# Patient Record
Sex: MALE | NOT HISPANIC OR LATINO
[De-identification: names, ages, dates, MRNs, and addresses within clinical notes are randomized per-mention and may not be internally consistent; named-entity substitution may affect disease eponyms.]

---

## 2017-11-24 PROBLEM — Z00.00 ENCOUNTER FOR PREVENTIVE HEALTH EXAMINATION: Status: ACTIVE | Noted: 2017-11-24

## 2018-03-23 ENCOUNTER — APPOINTMENT (OUTPATIENT)
Dept: CARDIOLOGY | Facility: CLINIC | Age: 48
End: 2018-03-23
Payer: COMMERCIAL

## 2018-03-23 VITALS
HEIGHT: 70 IN | BODY MASS INDEX: 28.63 KG/M2 | WEIGHT: 200 LBS | DIASTOLIC BLOOD PRESSURE: 74 MMHG | RESPIRATION RATE: 15 BRPM | HEART RATE: 76 BPM | SYSTOLIC BLOOD PRESSURE: 120 MMHG

## 2018-03-23 DIAGNOSIS — Z82.49 FAMILY HISTORY OF ISCHEMIC HEART DISEASE AND OTHER DISEASES OF THE CIRCULATORY SYSTEM: ICD-10-CM

## 2018-03-23 PROCEDURE — 93306 TTE W/DOPPLER COMPLETE: CPT

## 2018-03-23 PROCEDURE — 93015 CV STRESS TEST SUPVJ I&R: CPT

## 2018-03-23 PROCEDURE — 93000 ELECTROCARDIOGRAM COMPLETE: CPT | Mod: 59

## 2018-03-23 PROCEDURE — 99213 OFFICE O/P EST LOW 20 MIN: CPT | Mod: 25

## 2018-08-03 ENCOUNTER — APPOINTMENT (OUTPATIENT)
Dept: CARDIOLOGY | Facility: CLINIC | Age: 48
End: 2018-08-03
Payer: COMMERCIAL

## 2018-08-03 VITALS
HEIGHT: 70 IN | HEART RATE: 74 BPM | SYSTOLIC BLOOD PRESSURE: 127 MMHG | WEIGHT: 203 LBS | RESPIRATION RATE: 15 BRPM | DIASTOLIC BLOOD PRESSURE: 80 MMHG | BODY MASS INDEX: 29.06 KG/M2

## 2018-08-03 PROCEDURE — 99213 OFFICE O/P EST LOW 20 MIN: CPT

## 2018-08-03 RX ORDER — RISPERIDONE 2 MG/1
2 TABLET, FILM COATED ORAL
Refills: 0 | Status: ACTIVE | COMMUNITY

## 2018-10-05 ENCOUNTER — APPOINTMENT (OUTPATIENT)
Dept: CARDIOLOGY | Facility: CLINIC | Age: 48
End: 2018-10-05

## 2019-01-04 ENCOUNTER — NON-APPOINTMENT (OUTPATIENT)
Age: 49
End: 2019-01-04

## 2019-01-04 ENCOUNTER — APPOINTMENT (OUTPATIENT)
Dept: CARDIOLOGY | Facility: CLINIC | Age: 49
End: 2019-01-04
Payer: COMMERCIAL

## 2019-01-04 VITALS
SYSTOLIC BLOOD PRESSURE: 116 MMHG | HEIGHT: 70 IN | BODY MASS INDEX: 29.63 KG/M2 | HEART RATE: 68 BPM | DIASTOLIC BLOOD PRESSURE: 78 MMHG | WEIGHT: 207 LBS | RESPIRATION RATE: 15 BRPM

## 2019-01-04 DIAGNOSIS — Z87.898 PERSONAL HISTORY OF OTHER SPECIFIED CONDITIONS: ICD-10-CM

## 2019-01-04 PROCEDURE — 99213 OFFICE O/P EST LOW 20 MIN: CPT

## 2019-01-04 PROCEDURE — 93000 ELECTROCARDIOGRAM COMPLETE: CPT

## 2019-06-28 ENCOUNTER — APPOINTMENT (OUTPATIENT)
Dept: CARDIOLOGY | Facility: CLINIC | Age: 49
End: 2019-06-28
Payer: COMMERCIAL

## 2019-06-28 PROCEDURE — 93306 TTE W/DOPPLER COMPLETE: CPT

## 2019-07-24 ENCOUNTER — APPOINTMENT (OUTPATIENT)
Dept: CARDIOLOGY | Facility: CLINIC | Age: 49
End: 2019-07-24
Payer: COMMERCIAL

## 2019-07-24 ENCOUNTER — NON-APPOINTMENT (OUTPATIENT)
Age: 49
End: 2019-07-24

## 2019-07-24 VITALS
DIASTOLIC BLOOD PRESSURE: 79 MMHG | RESPIRATION RATE: 15 BRPM | BODY MASS INDEX: 29.63 KG/M2 | HEIGHT: 70 IN | WEIGHT: 207 LBS | HEART RATE: 64 BPM | SYSTOLIC BLOOD PRESSURE: 125 MMHG

## 2019-07-24 PROCEDURE — 99213 OFFICE O/P EST LOW 20 MIN: CPT

## 2019-07-24 PROCEDURE — 93000 ELECTROCARDIOGRAM COMPLETE: CPT

## 2020-01-22 ENCOUNTER — APPOINTMENT (OUTPATIENT)
Dept: CARDIOLOGY | Facility: CLINIC | Age: 50
End: 2020-01-22
Payer: COMMERCIAL

## 2020-01-22 VITALS
HEART RATE: 64 BPM | RESPIRATION RATE: 15 BRPM | WEIGHT: 209 LBS | SYSTOLIC BLOOD PRESSURE: 125 MMHG | DIASTOLIC BLOOD PRESSURE: 79 MMHG | HEIGHT: 70 IN | BODY MASS INDEX: 29.92 KG/M2

## 2020-01-22 PROCEDURE — 99214 OFFICE O/P EST MOD 30 MIN: CPT

## 2020-07-01 ENCOUNTER — APPOINTMENT (OUTPATIENT)
Dept: CARDIOLOGY | Facility: CLINIC | Age: 50
End: 2020-07-01
Payer: COMMERCIAL

## 2020-07-01 ENCOUNTER — TRANSCRIPTION ENCOUNTER (OUTPATIENT)
Age: 50
End: 2020-07-01

## 2020-07-01 VITALS
SYSTOLIC BLOOD PRESSURE: 129 MMHG | HEIGHT: 70 IN | DIASTOLIC BLOOD PRESSURE: 80 MMHG | HEART RATE: 62 BPM | WEIGHT: 208 LBS | RESPIRATION RATE: 15 BRPM | BODY MASS INDEX: 29.78 KG/M2

## 2020-07-01 PROCEDURE — 93015 CV STRESS TEST SUPVJ I&R: CPT

## 2020-07-01 PROCEDURE — 99213 OFFICE O/P EST LOW 20 MIN: CPT | Mod: 25

## 2020-07-01 PROCEDURE — 93306 TTE W/DOPPLER COMPLETE: CPT

## 2020-07-01 PROCEDURE — ZZZZZ: CPT

## 2020-07-01 NOTE — PHYSICAL EXAM
[General Appearance - Well Developed] : well developed [Well Groomed] : well groomed [Normal Appearance] : normal appearance [General Appearance - Well Nourished] : well nourished [General Appearance - In No Acute Distress] : no acute distress [Normal Conjunctiva] : the conjunctiva exhibited no abnormalities [No Deformities] : no deformities [Normal Oral Mucosa] : normal oral mucosa [No Oral Pallor] : no oral pallor [Normal Jugular Venous A Waves Present] : normal jugular venous A waves present [Normal Oropharynx] : normal oropharynx [Normal Jugular Venous V Waves Present] : normal jugular venous V waves present [No Jugular Venous Yip A Waves] : no jugular venous yip A waves [Exaggerated Use Of Accessory Muscles For Inspiration] : no accessory muscle use [Respiration, Rhythm And Depth] : normal respiratory rhythm and effort [Auscultation Breath Sounds / Voice Sounds] : lungs were clear to auscultation bilaterally [Bowel Sounds] : normal bowel sounds [Abdomen Soft] : soft [Abdomen Tenderness] : non-tender [Abnormal Walk] : normal gait [Nail Clubbing] : no clubbing of the fingernails [Petechial Hemorrhages (___cm)] : no petechial hemorrhages [Gait - Sufficient For Exercise Testing] : the gait was sufficient for exercise testing [Cyanosis, Localized] : no localized cyanosis [Skin Turgor] : normal skin turgor [] : no rash [Skin Color & Pigmentation] : normal skin color and pigmentation [Oriented To Time, Place, And Person] : oriented to person, place, and time [No Venous Stasis] : no venous stasis [Mood] : the mood was normal [No Anxiety] : not feeling anxious [Impaired Insight] : insight and judgment were intact [Affect] : the affect was normal [5th Left ICS - MCL] : palpated at the 5th LICS in the midclavicular line [Normal] : normal [Normal Rate] : normal [No Precordial Heave] : no precordial heave was noted [Rhythm Regular] : regular [Normal S1] : normal S1 [Normal S2] : normal S2 [No Gallop] : no gallop heard [S3] : no S3 [S4] : no S4 [No Murmur] : no murmurs heard [Right Carotid Bruit] : no bruit heard over the right carotid [Left Carotid Bruit] : no bruit heard over the left carotid [Right Femoral Bruit] : no bruit heard over the right femoral artery [Left Femoral Bruit] : no bruit heard over the left femoral artery [2+] : left 2+ [No Abnormalities] : the abdominal aorta was not enlarged and no bruit was heard [No Pitting Edema] : no pitting edema present

## 2020-07-01 NOTE — REASON FOR VISIT
[Mitral Regurgitation] : mitral regurgitation [Follow-Up - Clinic] : a clinic follow-up of [Palpitations] : palpitations [FreeTextEntry1] : 49 y/o male with PMHx of MVP, GERD and anxiety presents for follow up.\par

## 2020-07-01 NOTE — DISCUSSION/SUMMARY
[FreeTextEntry1] : This is a 50-year-old male with a history significant for occasional palpitations,dyspepsia,  MVP/MR, borderline elevated cholesterol, who comes in for followup cardiac evaluation.\par The patient had a normal exercise stress test July 1, 2020.\par He will have an echo Doppler examination later today to evaluate his murmur, left ventricular function, chamber size, and rule out hypertrophy.\par The patient is encouraged to increase his hydration.\par Please send me a copy of his most recent blood work for my review.\par \par An Echo Doppler examination June 28, 2019 demonstrated bileaflet mitral valve prolapse with mitral valve regurgitation, tricuspid valve regurgitation, pulmonic valve regurgitation.\par The patient understands that aerobic exercises must be increased to 40 minutes 4 times per week. A detailed discussion of lifestyle modification was done today. The patient has a good understanding of the diagnosis, and treatment plan. Lifestyle modification was also outlined.\par \par \par \par The patient had an echo Doppler examination done June 2019 which demonstrated bileaflet mitral valve prolapse with mild mitral valve regurgitation, tricuspid valve regurgitation satisfactory left ventricular function.  This is essentially unchanged from his echocardiogram done March 23, 2018.\par The patient denies chest pain, palpitations, or dizziness.\par The patient will followup with you regarding his overall medical care. He is aware of his mitral valve prolapse with mild mitral valve regurgitation.\par Electrocardiogram done January 4, 2019 demonstrates sinus bradycardia at 50 beats per min is otherwise unremarkable.\par \par The patient understands that aerobic exercises must be increased to 40 minutes 4 times per week. A detailed discussion of lifestyle modification was done today. The patient has a good understanding of the diagnosis, and treatment plan. Lifestyle modification was also outlined.

## 2020-07-01 NOTE — HISTORY OF PRESENT ILLNESS
[FreeTextEntry1] : 50 y/o male with PMHx of MVP, GERD and anxiety presents for follow up.\par \par He does not exercise at the gym per se but endorses being very active and denies any CP, SOB, palpitations, dizziness or claudication.

## 2020-11-17 ENCOUNTER — APPOINTMENT (OUTPATIENT)
Dept: CARDIOLOGY | Facility: CLINIC | Age: 50
End: 2020-11-17
Payer: COMMERCIAL

## 2020-11-17 VITALS
HEIGHT: 70 IN | BODY MASS INDEX: 30.64 KG/M2 | HEART RATE: 75 BPM | SYSTOLIC BLOOD PRESSURE: 120 MMHG | DIASTOLIC BLOOD PRESSURE: 80 MMHG | WEIGHT: 214 LBS | RESPIRATION RATE: 16 BRPM

## 2020-11-17 PROCEDURE — 99072 ADDL SUPL MATRL&STAF TM PHE: CPT

## 2020-11-17 PROCEDURE — 99213 OFFICE O/P EST LOW 20 MIN: CPT

## 2021-10-06 ENCOUNTER — APPOINTMENT (OUTPATIENT)
Dept: CARDIOLOGY | Facility: CLINIC | Age: 51
End: 2021-10-06
Payer: COMMERCIAL

## 2021-10-06 ENCOUNTER — NON-APPOINTMENT (OUTPATIENT)
Age: 51
End: 2021-10-06

## 2021-10-06 ENCOUNTER — LABORATORY RESULT (OUTPATIENT)
Age: 51
End: 2021-10-06

## 2021-10-06 VITALS
OXYGEN SATURATION: 99 % | BODY MASS INDEX: 29.92 KG/M2 | HEART RATE: 60 BPM | TEMPERATURE: 97.1 F | WEIGHT: 209 LBS | DIASTOLIC BLOOD PRESSURE: 84 MMHG | SYSTOLIC BLOOD PRESSURE: 120 MMHG | HEIGHT: 70 IN | RESPIRATION RATE: 16 BRPM

## 2021-10-06 PROCEDURE — 93000 ELECTROCARDIOGRAM COMPLETE: CPT

## 2021-10-06 PROCEDURE — 93306 TTE W/DOPPLER COMPLETE: CPT

## 2021-10-06 PROCEDURE — 99213 OFFICE O/P EST LOW 20 MIN: CPT

## 2021-10-06 RX ORDER — OMEPRAZOLE 40 MG/1
40 CAPSULE, DELAYED RELEASE ORAL
Refills: 0 | Status: COMPLETED | COMMUNITY
End: 2021-10-06

## 2021-10-06 RX ORDER — OMEPRAZOLE 20 MG/1
20 CAPSULE, DELAYED RELEASE ORAL
Qty: 30 | Refills: 3 | Status: ACTIVE | COMMUNITY

## 2021-10-06 NOTE — REASON FOR VISIT
[Follow-Up - Clinic] : a clinic follow-up of [Mitral Regurgitation] : mitral regurgitation [Palpitations] : palpitations [CV Risk Factors and Non-Cardiac Disease] : CV risk factors and non-cardiac disease [Other: ____] : [unfilled] [FreeTextEntry1] : MVP, murmur

## 2021-10-06 NOTE — PHYSICAL EXAM
[General Appearance - Well Developed] : well developed [Normal Appearance] : normal appearance [Well Groomed] : well groomed [General Appearance - Well Nourished] : well nourished [No Deformities] : no deformities [General Appearance - In No Acute Distress] : no acute distress [Normal Conjunctiva] : the conjunctiva exhibited no abnormalities [Normal Oral Mucosa] : normal oral mucosa [No Oral Pallor] : no oral pallor [Normal Oropharynx] : normal oropharynx [Normal Jugular Venous A Waves Present] : normal jugular venous A waves present [Normal Jugular Venous V Waves Present] : normal jugular venous V waves present [No Jugular Venous Yip A Waves] : no jugular venous yip A waves [Respiration, Rhythm And Depth] : normal respiratory rhythm and effort [Exaggerated Use Of Accessory Muscles For Inspiration] : no accessory muscle use [Auscultation Breath Sounds / Voice Sounds] : lungs were clear to auscultation bilaterally [Bowel Sounds] : normal bowel sounds [Abdomen Soft] : soft [Abdomen Tenderness] : non-tender [Abnormal Walk] : normal gait [Gait - Sufficient For Exercise Testing] : the gait was sufficient for exercise testing [Nail Clubbing] : no clubbing of the fingernails [Cyanosis, Localized] : no localized cyanosis [Petechial Hemorrhages (___cm)] : no petechial hemorrhages [Skin Color & Pigmentation] : normal skin color and pigmentation [Skin Turgor] : normal skin turgor [] : no rash [No Venous Stasis] : no venous stasis [Impaired Insight] : insight and judgment were intact [Oriented To Time, Place, And Person] : oriented to person, place, and time [Affect] : the affect was normal [Mood] : the mood was normal [No Anxiety] : not feeling anxious [5th Left ICS - MCL] : palpated at the 5th LICS in the midclavicular line [Normal] : normal [No Precordial Heave] : no precordial heave was noted [Rhythm Regular] : regular [Normal Rate] : normal [Normal S1] : normal S1 [Normal S2] : normal S2 [No Gallop] : no gallop heard [No Murmur] : no murmurs heard [2+] : left 2+ [No Abnormalities] : the abdominal aorta was not enlarged and no bruit was heard [No Pitting Edema] : no pitting edema present [S3] : no S3 [S4] : no S4 [Right Carotid Bruit] : no bruit heard over the right carotid [Left Carotid Bruit] : no bruit heard over the left carotid [Left Femoral Bruit] : no bruit heard over the left femoral artery [Right Femoral Bruit] : no bruit heard over the right femoral artery

## 2021-10-06 NOTE — DISCUSSION/SUMMARY
[FreeTextEntry1] : This is a 51-year-old male with a history significant for occasional palpitations,dyspepsia, bileaflet mitral valve prolapse with minimal to mild mitral valve regurgitation, borderline elevated cholesterol, who comes in for followup cardiac evaluation. \par He denies chest pain, shortness of breath, dizziness or syncope.\par The patient will have new blood work done today for lipid panel, and electrolytes.\par Electrocardiogram done October 6, 2021 demonstrate normal sinus rhythm rate 60 bpm and is otherwise unremarkable.\par Patient understands he must maintain good hydration.\par Echo Doppler examination done July 1, 2020 which demonstrated bileaflet mitral valve prolapse and minimal to mild mitral valve regurgitation, thickened aortic valve leaflets, minimal tricuspid and pulmonic valve regurgitation with normal left ventricular function and estimated ejection fraction 64%.\par The patient had a normal exercise stress test July 1, 2020.\par \par Echo Doppler examination June 28, 2019 demonstrated bileaflet mitral valve prolapse with mitral valve regurgitation, tricuspid valve regurgitation, pulmonic valve regurgitation.\par \par The patient understands that aerobic exercises must be increased to 40 minutes 4 times per week. A detailed discussion of lifestyle modification was done today. The patient has a good understanding of the diagnosis, and treatment plan. Lifestyle modification was also outlined.\par \par \par \par The patient had an echo Doppler examination done June 2019 which demonstrated bileaflet mitral valve prolapse with mild mitral valve regurgitation, tricuspid valve regurgitation satisfactory left ventricular function.  This is essentially unchanged from his echocardiogram done March 23, 2018.\par The patient denies chest pain, palpitations, or dizziness.\par The patient will followup with you regarding his overall medical care. He is aware of his mitral valve prolapse with mild mitral valve regurgitation.\par Electrocardiogram done January 4, 2019 demonstrates sinus bradycardia at 50 beats per min is otherwise unremarkable.\par \par The patient understands that aerobic exercises must be increased to 40 minutes 4 times per week. A detailed discussion of lifestyle modification was done today. The patient has a good understanding of the diagnosis, and treatment plan. Lifestyle modification was also outlined.

## 2021-10-06 NOTE — HISTORY OF PRESENT ILLNESS
[FreeTextEntry1] : 49 y/o male with PMHx of MVP, GERD and anxiety presents for follow up.\par \par Doing well overall. Denies cp, sob, presyncope or syncope. Walks his dog twice a day. goes up and down the stairs without issues.\par \par

## 2021-10-12 ENCOUNTER — NON-APPOINTMENT (OUTPATIENT)
Age: 51
End: 2021-10-12

## 2022-04-05 ENCOUNTER — LABORATORY RESULT (OUTPATIENT)
Age: 52
End: 2022-04-05

## 2022-04-05 ENCOUNTER — APPOINTMENT (OUTPATIENT)
Dept: CARDIOLOGY | Facility: CLINIC | Age: 52
End: 2022-04-05
Payer: COMMERCIAL

## 2022-04-05 ENCOUNTER — NON-APPOINTMENT (OUTPATIENT)
Age: 52
End: 2022-04-05

## 2022-04-05 VITALS
HEIGHT: 70 IN | HEART RATE: 75 BPM | TEMPERATURE: 97.5 F | BODY MASS INDEX: 30.06 KG/M2 | WEIGHT: 210 LBS | DIASTOLIC BLOOD PRESSURE: 79 MMHG | OXYGEN SATURATION: 98 % | SYSTOLIC BLOOD PRESSURE: 125 MMHG | RESPIRATION RATE: 16 BRPM

## 2022-04-05 DIAGNOSIS — E66.3 OVERWEIGHT: ICD-10-CM

## 2022-04-05 PROCEDURE — 99214 OFFICE O/P EST MOD 30 MIN: CPT

## 2022-04-05 PROCEDURE — 93000 ELECTROCARDIOGRAM COMPLETE: CPT

## 2022-04-05 NOTE — DISCUSSION/SUMMARY
[FreeTextEntry1] : This is a 51-year-old male with a past medical history significant for mitral valve prolapse with mitral valve regurgitation, occasional palpitations,dyspepsia, borderline elevated cholesterol, who comes in for followup cardiac evaluation.  He denies chest pain, shortness of breath, dizziness or syncope.\par Electrocardiogram done April 5, 2022 demonstrate normal sinus rhythm rate of 72 bpm is otherwise unremarkable.\par He will have new blood work done today for lipid panel, SMA-20.  He understands he must maintain good hydration.\par Electrocardiogram done October 6, 2021 demonstrate normal sinus rhythm rate 60 bpm and is otherwise unremarkable.\par Patient understands he must maintain good hydration.\par Echo Doppler examination done July 1, 2020 which demonstrated bileaflet mitral valve prolapse and minimal to mild mitral valve regurgitation, thickened aortic valve leaflets, minimal tricuspid and pulmonic valve regurgitation with normal left ventricular function and estimated ejection fraction 64%.\par The patient had a normal exercise stress test July 1, 2020.\par Echo Doppler examination done October 6, 2021 demonstrated ejection fraction of 64 to 65%, bileaflet mitral valve prolapse and mild mitral valve regurgitation, minimal to mild tricuspid valve regurgitation, minimal pulmonic valve regurgitation.\par Echo Doppler examination June 28, 2019 demonstrated bileaflet mitral valve prolapse with mitral valve regurgitation, tricuspid valve regurgitation, pulmonic valve regurgitation.\par \par The patient understands that aerobic exercises must be increased to 40 minutes 4 times per week. A detailed discussion of lifestyle modification was done today. The patient has a good understanding of the diagnosis, and treatment plan. Lifestyle modification was also outlined.\par

## 2022-04-05 NOTE — REASON FOR VISIT
[CV Risk Factors and Non-Cardiac Disease] : CV risk factors and non-cardiac disease [Other: ____] : [unfilled] [Follow-Up - Clinic] : a clinic follow-up of [Mitral Regurgitation] : mitral regurgitation [Palpitations] : palpitations [FreeTextEntry1] : MVP, murmur

## 2022-04-05 NOTE — HISTORY OF PRESENT ILLNESS
[FreeTextEntry1] : 51 y/o male with PMHx of MVP, GERD and anxiety presents for follow up.\par \par Doing well overall. Denies cp, sob, presyncope or syncope. Walks his dog twice a day. goes up and down the stairs without issues.\par \par

## 2022-04-05 NOTE — PHYSICAL EXAM
[General Appearance - Well Developed] : well developed [Normal Appearance] : normal appearance [Well Groomed] : well groomed [General Appearance - Well Nourished] : well nourished [No Deformities] : no deformities [General Appearance - In No Acute Distress] : no acute distress [Normal Conjunctiva] : the conjunctiva exhibited no abnormalities [Normal Oral Mucosa] : normal oral mucosa [No Oral Pallor] : no oral pallor [Normal Oropharynx] : normal oropharynx [Normal Jugular Venous A Waves Present] : normal jugular venous A waves present [Normal Jugular Venous V Waves Present] : normal jugular venous V waves present [No Jugular Venous Yip A Waves] : no jugular venous yip A waves [Respiration, Rhythm And Depth] : normal respiratory rhythm and effort [Exaggerated Use Of Accessory Muscles For Inspiration] : no accessory muscle use [Auscultation Breath Sounds / Voice Sounds] : lungs were clear to auscultation bilaterally [Bowel Sounds] : normal bowel sounds [Abdomen Soft] : soft [Abdomen Tenderness] : non-tender [Abnormal Walk] : normal gait [Gait - Sufficient For Exercise Testing] : the gait was sufficient for exercise testing [Nail Clubbing] : no clubbing of the fingernails [Cyanosis, Localized] : no localized cyanosis [Petechial Hemorrhages (___cm)] : no petechial hemorrhages [Skin Color & Pigmentation] : normal skin color and pigmentation [Skin Turgor] : normal skin turgor [] : no rash [No Venous Stasis] : no venous stasis [Oriented To Time, Place, And Person] : oriented to person, place, and time [Impaired Insight] : insight and judgment were intact [Affect] : the affect was normal [Mood] : the mood was normal [No Anxiety] : not feeling anxious [5th Left ICS - MCL] : palpated at the 5th LICS in the midclavicular line [Normal] : normal [No Precordial Heave] : no precordial heave was noted [Normal Rate] : normal [Rhythm Regular] : regular [Normal S1] : normal S1 [Normal S2] : normal S2 [No Gallop] : no gallop heard [No Murmur] : no murmurs heard [2+] : left 2+ [No Abnormalities] : the abdominal aorta was not enlarged and no bruit was heard [No Pitting Edema] : no pitting edema present [S3] : no S3 [S4] : no S4 [Right Carotid Bruit] : no bruit heard over the right carotid [Left Carotid Bruit] : no bruit heard over the left carotid [Right Femoral Bruit] : no bruit heard over the right femoral artery [Left Femoral Bruit] : no bruit heard over the left femoral artery

## 2022-10-25 ENCOUNTER — LABORATORY RESULT (OUTPATIENT)
Age: 52
End: 2022-10-25

## 2022-10-25 ENCOUNTER — APPOINTMENT (OUTPATIENT)
Dept: CARDIOLOGY | Facility: CLINIC | Age: 52
End: 2022-10-25

## 2022-10-25 VITALS
RESPIRATION RATE: 16 BRPM | DIASTOLIC BLOOD PRESSURE: 77 MMHG | HEART RATE: 79 BPM | OXYGEN SATURATION: 100 % | SYSTOLIC BLOOD PRESSURE: 124 MMHG | HEIGHT: 70 IN | BODY MASS INDEX: 30.06 KG/M2 | TEMPERATURE: 97.3 F | WEIGHT: 210 LBS

## 2022-10-25 PROCEDURE — 93015 CV STRESS TEST SUPVJ I&R: CPT

## 2022-10-25 PROCEDURE — 99213 OFFICE O/P EST LOW 20 MIN: CPT | Mod: 25

## 2022-10-25 NOTE — DISCUSSION/SUMMARY
[FreeTextEntry1] : This is a 52-year-old male with a past medical history significant for mitral valve prolapse with mitral valve regurgitation, occasional palpitations,dyspepsia, borderline elevated cholesterol, who comes in for followup cardiac evaluation.  He denies chest pain, shortness of breath, dizziness or syncope.  He may get occasional dyspnea on exertion with stairs.\par The patient had normal exercise stress test October 25, 2022.\par Electrocardiogram done April 5, 2022 demonstrate normal sinus rhythm rate of 72 bpm is otherwise unremarkable.\par The patient will have new blood work done today for lipid panel, SMA-20, hemoglobin A1c and TSH.\par Electrocardiogram done October 6, 2021 demonstrate normal sinus rhythm rate 60 bpm and is otherwise unremarkable.\par Patient understands he must maintain good hydration.\par Echo Doppler examination done July 1, 2020 which demonstrated bileaflet mitral valve prolapse and minimal to mild mitral valve regurgitation, thickened aortic valve leaflets, minimal tricuspid and pulmonic valve regurgitation with normal left ventricular function and estimated ejection fraction 64%.\par The patient had a normal exercise stress test July 1, 2020.\par Echo Doppler examination done October 6, 2021 demonstrated ejection fraction of 64 to 65%, bileaflet mitral valve prolapse and mild mitral valve regurgitation, minimal to mild tricuspid valve regurgitation, minimal pulmonic valve regurgitation.\par Echo Doppler examination June 28, 2019 demonstrated bileaflet mitral valve prolapse with mitral valve regurgitation, tricuspid valve regurgitation, pulmonic valve regurgitation.\par \par The patient understands that aerobic exercises must be increased to 40 minutes 4 times per week. A detailed discussion of lifestyle modification was done today. The patient has a good understanding of the diagnosis, and treatment plan. Lifestyle modification was also outlined.\par

## 2023-03-28 ENCOUNTER — LABORATORY RESULT (OUTPATIENT)
Age: 53
End: 2023-03-28

## 2023-03-28 ENCOUNTER — APPOINTMENT (OUTPATIENT)
Dept: CARDIOLOGY | Facility: CLINIC | Age: 53
End: 2023-03-28
Payer: COMMERCIAL

## 2023-03-28 ENCOUNTER — NON-APPOINTMENT (OUTPATIENT)
Age: 53
End: 2023-03-28

## 2023-03-28 VITALS
BODY MASS INDEX: 30.92 KG/M2 | HEART RATE: 55 BPM | WEIGHT: 216 LBS | RESPIRATION RATE: 16 BRPM | OXYGEN SATURATION: 99 % | TEMPERATURE: 97.5 F | HEIGHT: 70 IN | SYSTOLIC BLOOD PRESSURE: 125 MMHG | DIASTOLIC BLOOD PRESSURE: 79 MMHG

## 2023-03-28 PROCEDURE — 93000 ELECTROCARDIOGRAM COMPLETE: CPT

## 2023-03-28 PROCEDURE — 99214 OFFICE O/P EST MOD 30 MIN: CPT | Mod: 25

## 2023-03-28 PROCEDURE — 93306 TTE W/DOPPLER COMPLETE: CPT

## 2023-03-28 NOTE — DISCUSSION/SUMMARY
[FreeTextEntry1] : This is a 52-year-old male with a past medical history significant for mitral valve prolapse with mitral valve regurgitation, occasional palpitations,dyspepsia, borderline elevated cholesterol, who comes in for followup cardiac evaluation.  He denies chest pain, shortness of breath, dizziness or syncope.  \par Electrocardiogram done March 28, 2023 demonstrated sinus bradycardia rate of 55 bpm is otherwise unremarkable.\par Lipid profile done October 25, 2022 demonstrated cholesterol 187, HDL 37, a triglycerides of 209 mg/dL, LDL calculated 108 mg/dL and non-HDL cholesterol 150 mg/dL with a direct LDL of 118 mg/dL and hemoglobin A1c of 5.3.\par The patient has been stable from a cardiac standpoint.  He is maintaining good hydration.\par He will have new blood work done today including a repeat of his elevated C-reactive protein from the prior blood test, and lipid profile.\par Lifestyle modification was reinforced.\par \par The patient had normal exercise stress test October 25, 2022.\par Electrocardiogram done April 5, 2022 demonstrate normal sinus rhythm rate of 72 bpm is otherwise unremarkable.\par Electrocardiogram done October 6, 2021 demonstrate normal sinus rhythm rate 60 bpm and is otherwise unremarkable.\par Patient understands he must maintain good hydration.\par Echo Doppler examination done July 1, 2020 which demonstrated bileaflet mitral valve prolapse and minimal to mild mitral valve regurgitation, thickened aortic valve leaflets, minimal tricuspid and pulmonic valve regurgitation with normal left ventricular function and estimated ejection fraction 64%.\par The patient had a normal exercise stress test July 1, 2020.\par Echo Doppler examination done October 6, 2021 demonstrated ejection fraction of 64 to 65%, bileaflet mitral valve prolapse and mild mitral valve regurgitation, minimal to mild tricuspid valve regurgitation, minimal pulmonic valve regurgitation.\par Echo Doppler examination June 28, 2019 demonstrated bileaflet mitral valve prolapse with mitral valve regurgitation, tricuspid valve regurgitation, pulmonic valve regurgitation.\par \par The patient understands that aerobic exercises must be increased to 40 minutes 4 times per week. A detailed discussion of lifestyle modification was done today. The patient has a good understanding of the diagnosis, and treatment plan. Lifestyle modification was also outlined.

## 2023-03-28 NOTE — REASON FOR VISIT
[Other: ____] : [unfilled] [CV Risk Factors and Non-Cardiac Disease] : CV risk factors and non-cardiac disease [Follow-Up - Clinic] : a clinic follow-up of [Mitral Regurgitation] : mitral regurgitation [Palpitations] : palpitations [FreeTextEntry1] : MVP, murmur

## 2023-03-28 NOTE — PHYSICAL EXAM
[General Appearance - Well Developed] : well developed [Normal Appearance] : normal appearance [General Appearance - Well Nourished] : well nourished [Well Groomed] : well groomed [No Deformities] : no deformities [Normal Conjunctiva] : the conjunctiva exhibited no abnormalities [General Appearance - In No Acute Distress] : no acute distress [Normal Oral Mucosa] : normal oral mucosa [No Oral Pallor] : no oral pallor [Normal Oropharynx] : normal oropharynx [Normal Jugular Venous A Waves Present] : normal jugular venous A waves present [Normal Jugular Venous V Waves Present] : normal jugular venous V waves present [No Jugular Venous Yip A Waves] : no jugular venous yip A waves [Respiration, Rhythm And Depth] : normal respiratory rhythm and effort [Auscultation Breath Sounds / Voice Sounds] : lungs were clear to auscultation bilaterally [Exaggerated Use Of Accessory Muscles For Inspiration] : no accessory muscle use [Bowel Sounds] : normal bowel sounds [Abdomen Soft] : soft [Abdomen Tenderness] : non-tender [Abnormal Walk] : normal gait [Gait - Sufficient For Exercise Testing] : the gait was sufficient for exercise testing [Nail Clubbing] : no clubbing of the fingernails [Cyanosis, Localized] : no localized cyanosis [Petechial Hemorrhages (___cm)] : no petechial hemorrhages [Skin Color & Pigmentation] : normal skin color and pigmentation [Skin Turgor] : normal skin turgor [] : no rash [No Venous Stasis] : no venous stasis [Oriented To Time, Place, And Person] : oriented to person, place, and time [Impaired Insight] : insight and judgment were intact [Affect] : the affect was normal [Mood] : the mood was normal [No Anxiety] : not feeling anxious [5th Left ICS - MCL] : palpated at the 5th LICS in the midclavicular line [Normal] : normal [No Precordial Heave] : no precordial heave was noted [Normal Rate] : normal [Rhythm Regular] : regular [Normal S1] : normal S1 [Normal S2] : normal S2 [No Gallop] : no gallop heard [S3] : no S3 [S4] : no S4 [No Murmur] : no murmurs heard [Right Carotid Bruit] : no bruit heard over the right carotid [Left Carotid Bruit] : no bruit heard over the left carotid [Right Femoral Bruit] : no bruit heard over the right femoral artery [Left Femoral Bruit] : no bruit heard over the left femoral artery [2+] : left 2+ [No Abnormalities] : the abdominal aorta was not enlarged and no bruit was heard [No Pitting Edema] : no pitting edema present

## 2023-10-03 ENCOUNTER — RESULT CHARGE (OUTPATIENT)
Age: 53
End: 2023-10-03

## 2023-10-04 ENCOUNTER — LABORATORY RESULT (OUTPATIENT)
Age: 53
End: 2023-10-04

## 2023-10-04 ENCOUNTER — NON-APPOINTMENT (OUTPATIENT)
Age: 53
End: 2023-10-04

## 2023-10-04 ENCOUNTER — APPOINTMENT (OUTPATIENT)
Dept: CARDIOLOGY | Facility: CLINIC | Age: 53
End: 2023-10-04
Payer: COMMERCIAL

## 2023-10-04 VITALS
BODY MASS INDEX: 30.64 KG/M2 | HEIGHT: 70 IN | OXYGEN SATURATION: 98 % | WEIGHT: 214 LBS | DIASTOLIC BLOOD PRESSURE: 80 MMHG | TEMPERATURE: 97.7 F | HEART RATE: 87 BPM | SYSTOLIC BLOOD PRESSURE: 124 MMHG

## 2023-10-04 PROCEDURE — 93000 ELECTROCARDIOGRAM COMPLETE: CPT

## 2023-10-04 PROCEDURE — 99214 OFFICE O/P EST MOD 30 MIN: CPT | Mod: 25

## 2023-11-07 ENCOUNTER — TRANSCRIPTION ENCOUNTER (OUTPATIENT)
Age: 53
End: 2023-11-07

## 2024-04-29 ENCOUNTER — APPOINTMENT (OUTPATIENT)
Dept: CARDIOLOGY | Facility: CLINIC | Age: 54
End: 2024-04-29
Payer: COMMERCIAL

## 2024-04-29 VITALS
TEMPERATURE: 97.9 F | SYSTOLIC BLOOD PRESSURE: 116 MMHG | DIASTOLIC BLOOD PRESSURE: 80 MMHG | WEIGHT: 213 LBS | HEART RATE: 77 BPM | HEIGHT: 70 IN | RESPIRATION RATE: 16 BRPM | BODY MASS INDEX: 30.49 KG/M2 | OXYGEN SATURATION: 99 %

## 2024-04-29 DIAGNOSIS — I34.0 NONRHEUMATIC MITRAL (VALVE) INSUFFICIENCY: ICD-10-CM

## 2024-04-29 DIAGNOSIS — R00.2 PALPITATIONS: ICD-10-CM

## 2024-04-29 DIAGNOSIS — I34.1 NONRHEUMATIC MITRAL (VALVE) PROLAPSE: ICD-10-CM

## 2024-04-29 DIAGNOSIS — R06.09 OTHER FORMS OF DYSPNEA: ICD-10-CM

## 2024-04-29 PROCEDURE — 99213 OFFICE O/P EST LOW 20 MIN: CPT | Mod: 25

## 2024-04-29 PROCEDURE — 93015 CV STRESS TEST SUPVJ I&R: CPT

## 2024-04-29 NOTE — DISCUSSION/SUMMARY
[FreeTextEntry1] : This is a 54-year-old male with a past medical history significant for mitral valve prolapse with mitral valve regurgitation, occasional palpitations, dyspepsia, borderline elevated cholesterol, who comes in for follow-up cardiac evaluation.  He denies chest pain, shortness of breath, dizziness or syncope. The patient had normal exercise stress test April 29, 2024. He will be having new blood work for lipid profile with his primary care physician in June or 2024. I have asked him to get me copy of those results for my review. Lipid profile done October 4, 2023 demonstrated LDL direct of 128, hemoglobin A1c of 5.6, non-HDL cholesterol 146, LDL calculated 122 mg/dL, cholesterol 183, HDL 37, triglycerides 134 mg/dL. Electrocardiogram done March 28, 2023 demonstrated sinus bradycardia rate of 55 bpm is otherwise unremarkable. Echo Doppler examination done March 28, 2023 demonstrated bileaflet mitral valve prolapse with minimal to mild tricuspid valve regurgitation, mild pulmonic valve regurgitation, mild left atrial enlargement, normal ejection fraction of 64%, mild mitral valve regurgitation. Blood work done March 28, 2023 demonstrated a cholesterol 161, HDL 37, triglycerides 150, LDL calculated 94, non-HDL cholesterol 123 mg/dL and LDL direct 102 mg/dL with hemoglobin A1c of 5.4. The patient will continue on his current diet and exercise program.  He is worried about his lipid panel which will be repeated today. He is instructed follow-up with his primary care physician. Lipid profile done October 25, 2022 demonstrated cholesterol 187, HDL 37, a triglycerides of 209 mg/dL, LDL calculated 108 mg/dL and non-HDL cholesterol 150 mg/dL with a direct LDL of 118 mg/dL and hemoglobin A1c of 5.3. The patient has been stable from a cardiac standpoint.  He is maintaining good hydration.. Lifestyle modification was reinforced. The patient had normal exercise stress test October 25, 2022. Electrocardiogram done April 5, 2022 demonstrate normal sinus rhythm rate of 72 bpm is otherwise unremarkable. Electrocardiogram done October 6, 2021 demonstrate normal sinus rhythm rate 60 bpm and is otherwise unremarkable. Patient understands he must maintain good hydration. Echo Doppler examination done July 1, 2020 which demonstrated bileaflet mitral valve prolapse and minimal to mild mitral valve regurgitation, thickened aortic valve leaflets, minimal tricuspid and pulmonic valve regurgitation with normal left ventricular function and estimated ejection fraction 64%. The patient had a normal exercise stress test July 1, 2020. Echo Doppler examination done October 6, 2021 demonstrated ejection fraction of 64 to 65%, bileaflet mitral valve prolapse and mild mitral valve regurgitation, minimal to mild tricuspid valve regurgitation, minimal pulmonic valve regurgitation. Echo Doppler examination June 28, 2019 demonstrated bileaflet mitral valve prolapse with mitral valve regurgitation, tricuspid valve regurgitation, pulmonic valve regurgitation.  The patient understands that aerobic exercises must be increased to 40 minutes 4 times per week. A detailed discussion of lifestyle modification was done today. The patient has a good understanding of the diagnosis, and treatment plan. Lifestyle modification was also outlined.

## 2024-04-29 NOTE — REASON FOR VISIT
[CV Risk Factors and Non-Cardiac Disease] : CV risk factors and non-cardiac disease [Other: ____] : [unfilled] [Follow-Up - Clinic] : a clinic follow-up of [Mitral Regurgitation] : mitral regurgitation [Palpitations] : palpitations [FreeTextEntry3] : Dr. Hunt [FreeTextEntry1] : MVP, murmur

## 2024-06-19 ENCOUNTER — TRANSCRIPTION ENCOUNTER (OUTPATIENT)
Age: 54
End: 2024-06-19

## 2024-07-23 ENCOUNTER — APPOINTMENT (OUTPATIENT)
Dept: CARDIOLOGY | Facility: CLINIC | Age: 54
End: 2024-07-23
Payer: COMMERCIAL

## 2024-07-23 PROCEDURE — 93306 TTE W/DOPPLER COMPLETE: CPT

## 2024-10-14 ENCOUNTER — LABORATORY RESULT (OUTPATIENT)
Age: 54
End: 2024-10-14

## 2024-10-14 ENCOUNTER — NON-APPOINTMENT (OUTPATIENT)
Age: 54
End: 2024-10-14

## 2024-10-14 ENCOUNTER — APPOINTMENT (OUTPATIENT)
Dept: CARDIOLOGY | Facility: CLINIC | Age: 54
End: 2024-10-14
Payer: COMMERCIAL

## 2024-10-14 VITALS
RESPIRATION RATE: 16 BRPM | BODY MASS INDEX: 30.21 KG/M2 | SYSTOLIC BLOOD PRESSURE: 118 MMHG | OXYGEN SATURATION: 97 % | WEIGHT: 211 LBS | DIASTOLIC BLOOD PRESSURE: 78 MMHG | HEIGHT: 70 IN | TEMPERATURE: 98.4 F | HEART RATE: 77 BPM

## 2024-10-14 DIAGNOSIS — E78.00 PURE HYPERCHOLESTEROLEMIA, UNSPECIFIED: ICD-10-CM

## 2024-10-14 DIAGNOSIS — I34.0 NONRHEUMATIC MITRAL (VALVE) INSUFFICIENCY: ICD-10-CM

## 2024-10-14 DIAGNOSIS — Z13.31 ENCOUNTER FOR SCREENING FOR DEPRESSION: ICD-10-CM

## 2024-10-14 DIAGNOSIS — I34.1 NONRHEUMATIC MITRAL (VALVE) PROLAPSE: ICD-10-CM

## 2024-10-14 PROCEDURE — G2211 COMPLEX E/M VISIT ADD ON: CPT | Mod: NC

## 2024-10-14 PROCEDURE — 93000 ELECTROCARDIOGRAM COMPLETE: CPT | Mod: 59

## 2024-10-14 PROCEDURE — G0444 DEPRESSION SCREEN ANNUAL: CPT

## 2024-10-14 PROCEDURE — 99214 OFFICE O/P EST MOD 30 MIN: CPT

## 2025-03-17 ENCOUNTER — APPOINTMENT (OUTPATIENT)
Dept: ORTHOPEDIC SURGERY | Facility: CLINIC | Age: 55
End: 2025-03-17
Payer: OTHER MISCELLANEOUS

## 2025-03-17 VITALS — HEIGHT: 70 IN | WEIGHT: 211 LBS | BODY MASS INDEX: 30.21 KG/M2

## 2025-03-17 DIAGNOSIS — S92.351A DISPLACED FRACTURE OF FIFTH METATARSAL BONE, RIGHT FOOT, INITIAL ENCOUNTER FOR CLOSED FRACTURE: ICD-10-CM

## 2025-03-17 PROCEDURE — L4361: CPT | Mod: RT

## 2025-03-17 PROCEDURE — 99204 OFFICE O/P NEW MOD 45 MIN: CPT

## 2025-04-07 ENCOUNTER — APPOINTMENT (OUTPATIENT)
Dept: ORTHOPEDIC SURGERY | Facility: CLINIC | Age: 55
End: 2025-04-07
Payer: OTHER MISCELLANEOUS

## 2025-04-07 ENCOUNTER — NON-APPOINTMENT (OUTPATIENT)
Age: 55
End: 2025-04-07

## 2025-04-07 VITALS — BODY MASS INDEX: 30.21 KG/M2 | WEIGHT: 211 LBS | HEIGHT: 70 IN

## 2025-04-07 DIAGNOSIS — S92.351D DISPLACED FRACTURE OF FIFTH METATARSAL BONE, RIGHT FOOT, SUBSEQUENT ENCOUNTER FOR FRACTURE WITH ROUTINE HEALING: ICD-10-CM

## 2025-04-07 PROCEDURE — 99214 OFFICE O/P EST MOD 30 MIN: CPT

## 2025-04-07 PROCEDURE — 73630 X-RAY EXAM OF FOOT: CPT | Mod: RT

## 2025-04-28 ENCOUNTER — APPOINTMENT (OUTPATIENT)
Dept: ORTHOPEDIC SURGERY | Facility: CLINIC | Age: 55
End: 2025-04-28
Payer: OTHER MISCELLANEOUS

## 2025-04-28 ENCOUNTER — NON-APPOINTMENT (OUTPATIENT)
Age: 55
End: 2025-04-28

## 2025-04-28 DIAGNOSIS — S92.351D DISPLACED FRACTURE OF FIFTH METATARSAL BONE, RIGHT FOOT, SUBSEQUENT ENCOUNTER FOR FRACTURE WITH ROUTINE HEALING: ICD-10-CM

## 2025-04-28 PROCEDURE — 73630 X-RAY EXAM OF FOOT: CPT | Mod: RT

## 2025-04-28 PROCEDURE — 99213 OFFICE O/P EST LOW 20 MIN: CPT

## 2025-06-02 ENCOUNTER — APPOINTMENT (OUTPATIENT)
Dept: ORTHOPEDIC SURGERY | Facility: CLINIC | Age: 55
End: 2025-06-02
Payer: OTHER MISCELLANEOUS

## 2025-06-02 VITALS — HEIGHT: 70 IN | WEIGHT: 211 LBS | BODY MASS INDEX: 30.21 KG/M2

## 2025-06-02 DIAGNOSIS — S92.351D DISPLACED FRACTURE OF FIFTH METATARSAL BONE, RIGHT FOOT, SUBSEQUENT ENCOUNTER FOR FRACTURE WITH ROUTINE HEALING: ICD-10-CM

## 2025-06-02 PROCEDURE — 73630 X-RAY EXAM OF FOOT: CPT | Mod: RT

## 2025-06-02 PROCEDURE — 99213 OFFICE O/P EST LOW 20 MIN: CPT

## 2025-09-09 ENCOUNTER — LABORATORY RESULT (OUTPATIENT)
Age: 55
End: 2025-09-09

## 2025-09-09 ENCOUNTER — APPOINTMENT (OUTPATIENT)
Dept: CARDIOLOGY | Facility: CLINIC | Age: 55
End: 2025-09-09
Payer: COMMERCIAL

## 2025-09-09 VITALS
SYSTOLIC BLOOD PRESSURE: 124 MMHG | DIASTOLIC BLOOD PRESSURE: 78 MMHG | WEIGHT: 203 LBS | HEIGHT: 70 IN | TEMPERATURE: 98 F | RESPIRATION RATE: 16 BRPM | OXYGEN SATURATION: 100 % | HEART RATE: 66 BPM | BODY MASS INDEX: 29.06 KG/M2

## 2025-09-09 DIAGNOSIS — I34.1 NONRHEUMATIC MITRAL (VALVE) PROLAPSE: ICD-10-CM

## 2025-09-09 DIAGNOSIS — E78.00 PURE HYPERCHOLESTEROLEMIA, UNSPECIFIED: ICD-10-CM

## 2025-09-09 DIAGNOSIS — R06.09 OTHER FORMS OF DYSPNEA: ICD-10-CM

## 2025-09-09 DIAGNOSIS — I34.0 NONRHEUMATIC MITRAL (VALVE) INSUFFICIENCY: ICD-10-CM

## 2025-09-09 DIAGNOSIS — R00.2 PALPITATIONS: ICD-10-CM

## 2025-09-09 PROCEDURE — 93000 ELECTROCARDIOGRAM COMPLETE: CPT

## 2025-09-09 PROCEDURE — G2211 COMPLEX E/M VISIT ADD ON: CPT | Mod: NC

## 2025-09-09 PROCEDURE — 99213 OFFICE O/P EST LOW 20 MIN: CPT
